# Patient Record
Sex: MALE | Race: WHITE | NOT HISPANIC OR LATINO
[De-identification: names, ages, dates, MRNs, and addresses within clinical notes are randomized per-mention and may not be internally consistent; named-entity substitution may affect disease eponyms.]

---

## 2017-09-21 ENCOUNTER — FORM ENCOUNTER (OUTPATIENT)
Age: 72
End: 2017-09-21

## 2017-09-22 ENCOUNTER — OUTPATIENT (OUTPATIENT)
Dept: OUTPATIENT SERVICES | Facility: HOSPITAL | Age: 72
LOS: 1 days | End: 2017-09-22
Payer: MEDICARE

## 2017-09-22 ENCOUNTER — APPOINTMENT (OUTPATIENT)
Dept: UROLOGY | Facility: CLINIC | Age: 72
End: 2017-09-22
Payer: MEDICARE

## 2017-09-22 ENCOUNTER — APPOINTMENT (OUTPATIENT)
Dept: CT IMAGING | Facility: IMAGING CENTER | Age: 72
End: 2017-09-22
Payer: MEDICARE

## 2017-09-22 VITALS
HEIGHT: 69 IN | BODY MASS INDEX: 32.58 KG/M2 | WEIGHT: 220 LBS | DIASTOLIC BLOOD PRESSURE: 84 MMHG | RESPIRATION RATE: 17 BRPM | HEART RATE: 57 BPM | TEMPERATURE: 98 F | SYSTOLIC BLOOD PRESSURE: 180 MMHG

## 2017-09-22 DIAGNOSIS — Z00.8 ENCOUNTER FOR OTHER GENERAL EXAMINATION: ICD-10-CM

## 2017-09-22 DIAGNOSIS — C64.9 MALIGNANT NEOPLASM OF UNSPECIFIED KIDNEY, EXCEPT RENAL PELVIS: ICD-10-CM

## 2017-09-22 PROCEDURE — 99214 OFFICE O/P EST MOD 30 MIN: CPT

## 2017-09-22 PROCEDURE — 74178 CT ABD&PLV WO CNTR FLWD CNTR: CPT

## 2017-09-22 PROCEDURE — 82565 ASSAY OF CREATININE: CPT

## 2017-09-22 PROCEDURE — 74178 CT ABD&PLV WO CNTR FLWD CNTR: CPT | Mod: 26

## 2017-10-10 ENCOUNTER — APPOINTMENT (OUTPATIENT)
Dept: UROLOGY | Facility: CLINIC | Age: 72
End: 2017-10-10
Payer: MEDICARE

## 2017-10-10 PROCEDURE — 99213 OFFICE O/P EST LOW 20 MIN: CPT

## 2018-09-14 ENCOUNTER — MESSAGE (OUTPATIENT)
Age: 73
End: 2018-09-14

## 2018-09-14 DIAGNOSIS — R79.89 OTHER SPECIFIED ABNORMAL FINDINGS OF BLOOD CHEMISTRY: ICD-10-CM

## 2018-09-18 ENCOUNTER — OTHER (OUTPATIENT)
Age: 73
End: 2018-09-18

## 2018-09-24 ENCOUNTER — FORM ENCOUNTER (OUTPATIENT)
Age: 73
End: 2018-09-24

## 2018-09-25 ENCOUNTER — MESSAGE (OUTPATIENT)
Age: 73
End: 2018-09-25

## 2018-09-25 ENCOUNTER — OUTPATIENT (OUTPATIENT)
Dept: OUTPATIENT SERVICES | Facility: HOSPITAL | Age: 73
LOS: 1 days | End: 2018-09-25

## 2018-09-25 ENCOUNTER — APPOINTMENT (OUTPATIENT)
Dept: UROLOGY | Facility: CLINIC | Age: 73
End: 2018-09-25
Payer: MEDICARE

## 2018-09-25 ENCOUNTER — APPOINTMENT (OUTPATIENT)
Dept: CT IMAGING | Facility: IMAGING CENTER | Age: 73
End: 2018-09-25
Payer: MEDICARE

## 2018-09-25 VITALS
TEMPERATURE: 97.8 F | BODY MASS INDEX: 33.33 KG/M2 | SYSTOLIC BLOOD PRESSURE: 143 MMHG | HEART RATE: 56 BPM | RESPIRATION RATE: 17 BRPM | WEIGHT: 225 LBS | HEIGHT: 69 IN | DIASTOLIC BLOOD PRESSURE: 82 MMHG

## 2018-09-25 DIAGNOSIS — R79.89 OTHER SPECIFIED ABNORMAL FINDINGS OF BLOOD CHEMISTRY: ICD-10-CM

## 2018-09-25 DIAGNOSIS — C64.9 MALIGNANT NEOPLASM OF UNSPECIFIED KIDNEY, EXCEPT RENAL PELVIS: ICD-10-CM

## 2018-09-25 PROCEDURE — 71250 CT THORAX DX C-: CPT | Mod: 26

## 2018-09-25 PROCEDURE — 74150 CT ABDOMEN W/O CONTRAST: CPT | Mod: 26

## 2018-09-25 PROCEDURE — 99214 OFFICE O/P EST MOD 30 MIN: CPT | Mod: PD

## 2018-09-25 RX ORDER — LEVOTHYROXINE SODIUM 0.14 MG/1
137 TABLET ORAL
Qty: 90 | Refills: 0 | Status: DISCONTINUED | COMMUNITY
Start: 2017-07-16 | End: 2018-09-25

## 2018-09-25 RX ORDER — IRBESARTAN 150 MG/1
150 TABLET ORAL
Refills: 0 | Status: ACTIVE | COMMUNITY

## 2018-09-26 ENCOUNTER — OUTPATIENT (OUTPATIENT)
Dept: OUTPATIENT SERVICES | Facility: HOSPITAL | Age: 73
LOS: 1 days | End: 2018-09-26

## 2018-09-26 ENCOUNTER — TRANSCRIPTION ENCOUNTER (OUTPATIENT)
Age: 73
End: 2018-09-26

## 2018-09-26 VITALS
OXYGEN SATURATION: 99 % | WEIGHT: 210.1 LBS | HEART RATE: 69 BPM | DIASTOLIC BLOOD PRESSURE: 83 MMHG | HEIGHT: 69 IN | TEMPERATURE: 99 F | RESPIRATION RATE: 14 BRPM | SYSTOLIC BLOOD PRESSURE: 146 MMHG

## 2018-09-26 DIAGNOSIS — Z90.5 ACQUIRED ABSENCE OF KIDNEY: Chronic | ICD-10-CM

## 2018-09-26 DIAGNOSIS — N20.0 CALCULUS OF KIDNEY: ICD-10-CM

## 2018-09-26 DIAGNOSIS — Z95.0 PRESENCE OF CARDIAC PACEMAKER: ICD-10-CM

## 2018-09-26 DIAGNOSIS — Z95.0 PRESENCE OF CARDIAC PACEMAKER: Chronic | ICD-10-CM

## 2018-09-26 DIAGNOSIS — Z01.818 ENCOUNTER FOR OTHER PREPROCEDURAL EXAMINATION: ICD-10-CM

## 2018-09-26 DIAGNOSIS — Z29.9 ENCOUNTER FOR PROPHYLACTIC MEASURES, UNSPECIFIED: ICD-10-CM

## 2018-09-26 LAB
ANION GAP SERPL CALC-SCNC: 8 MMOL/L — SIGNIFICANT CHANGE UP (ref 5–17)
BLD GP AB SCN SERPL QL: NEGATIVE — SIGNIFICANT CHANGE UP
BUN SERPL-MCNC: 33 MG/DL — HIGH (ref 7–23)
CALCIUM SERPL-MCNC: 10.7 MG/DL — HIGH (ref 8.4–10.5)
CHLORIDE SERPL-SCNC: 105 MMOL/L — SIGNIFICANT CHANGE UP (ref 96–108)
CO2 SERPL-SCNC: 27 MMOL/L — SIGNIFICANT CHANGE UP (ref 22–31)
CREAT SERPL-MCNC: 2.01 MG/DL — HIGH (ref 0.5–1.3)
GLUCOSE SERPL-MCNC: 84 MG/DL — SIGNIFICANT CHANGE UP (ref 70–99)
HCT VFR BLD CALC: 37 % — LOW (ref 39–50)
HGB BLD-MCNC: 12.2 G/DL — LOW (ref 13–17)
MCHC RBC-ENTMCNC: 30.8 PG — SIGNIFICANT CHANGE UP (ref 27–34)
MCHC RBC-ENTMCNC: 33 GM/DL — SIGNIFICANT CHANGE UP (ref 32–36)
MCV RBC AUTO: 93.4 FL — SIGNIFICANT CHANGE UP (ref 80–100)
PLATELET # BLD AUTO: 243 K/UL — SIGNIFICANT CHANGE UP (ref 150–400)
POTASSIUM SERPL-MCNC: 4.4 MMOL/L — SIGNIFICANT CHANGE UP (ref 3.5–5.3)
POTASSIUM SERPL-SCNC: 4.4 MMOL/L — SIGNIFICANT CHANGE UP (ref 3.5–5.3)
RBC # BLD: 3.96 M/UL — LOW (ref 4.2–5.8)
RBC # FLD: 12.1 % — SIGNIFICANT CHANGE UP (ref 10.3–14.5)
RH IG SCN BLD-IMP: POSITIVE — SIGNIFICANT CHANGE UP
SODIUM SERPL-SCNC: 140 MMOL/L — SIGNIFICANT CHANGE UP (ref 135–145)
WBC # BLD: 6.01 K/UL — SIGNIFICANT CHANGE UP (ref 3.8–10.5)
WBC # FLD AUTO: 6.01 K/UL — SIGNIFICANT CHANGE UP (ref 3.8–10.5)

## 2018-09-26 NOTE — H&P PST ADULT - PROBLEM SELECTOR PLAN 2
PPM last interrogated in early 9/2018.  called cardiologist to fax report to [facility].  OR booking notified.

## 2018-09-26 NOTE — H&P PST ADULT - ACTIVITY
able to climb a flight of stairs able to walk up 2 flights of stairs, able to jog for 1 block, plays golf, occasional walks

## 2018-09-26 NOTE — H&P PST ADULT - PMH
Aneurysm of iliac artery  left  Malignant neoplasm    Hypothyroidism    Thyroid cancer  thyroidectomy, no radiation or chemo  Pacemaker  2006  Hyperlipidemia    Hypertension Aneurysm of iliac artery  left  Hyperlipidemia    Hypertension    Hypothyroidism    Malignant neoplasm urinary    Pacemaker  secondary to syncope, bradycardia 2006, battery replacement 2017  Thyroid cancer  thyroidectomy, no radiation or chemo Aneurysm of iliac artery  left  Hyperlipidemia    Hypertension    Hypothyroidism    Malignant neoplasm urinary    Pacemaker  secondary to 1 degree AV block, syncope, bradycardia 2006, battery replacement 2017  Renal cell carcinoma, left  2013, s/p left nephrectomy  Thyroid cancer  thyroidectomy, no radiation or chemo

## 2018-09-26 NOTE — H&P PST ADULT - PSH
History of tonsillectomy  childhood  Aneurysm of iliac artery  1/2013- left  H/O thyroidectomy  1/2013 Cardiac pacemaker    H/O thyroidectomy  1/2013  H/O unilateral nephrectomy  left 2013  History of tonsillectomy  childhood  iliac artery stent  1/2013- left

## 2018-09-26 NOTE — H&P PST ADULT - VENOUS THROMBOEMBOLISM CURRENT STATUS
major surgery lasting 2-3 hrs major surgery, including arthroscopic and laparoscopic (greater than 1 hr)/swollen legs

## 2018-09-26 NOTE — H&P PST ADULT - NS MD HP INPLANTS MED DEV
Pacemaker/model 5386, serial- [id number], left illiac stent ST Becerril 5386, ON 1270, left illiac stent/Pacemaker

## 2018-09-26 NOTE — H&P PST ADULT - HISTORY OF PRESENT ILLNESS
69y/o male scheduled for laparoscopic left partial nephrectomy, possible radical nephrectomy on 1/30/2013.  Pt states, "12/2012 had blood in the urine, cat scan shows right kidney mass.  Also showed left iliac  aneurysm- stent was placed 1/2013."      Pt has a pacemaker.  ---------------  MARLY FLORES is here today for a follow-up visit for s/p left nephrectomy in 2013 for RCC Pt1b FG 3 and right marely stone.     History of Present Illness  Left partial nephrectomy in 2013 . Right renal stone as well . His creatinine is 2.1 today with GFR of 37 . Scans were done without contrast . His nephrologist decreased his meds due to increase in creatinine .   He has protein and blood in his urine follow by nephrologist .He has no complains .    Had fevers from a URI. UCx is negative.     Patient is currently experiencing urinary frequency and nocturia.   Pain Level: None     Active Problems 72 yo male. h/o HTN, HLD,  s/p left nephrectomy in 2013 for RCC, thyroid carcinoma (s/p thyroidectomy), s/p PPM insertion (syncope, 1st degree AV block, bradycardia 2006).  recent follow up urology imaging study revealed renal calculus, presents to PST today scheduled for cystoscopy, stone extraction and possible right PCNL.

## 2018-09-26 NOTE — H&P PST ADULT - OTHER CARE PROVIDERS
Dr. Stuart Landau cardiologist [phone], fax [phone], Dr. Lucasious vascular [phone] Dr. Stuart Landau cardiologist [phone], fax [phone], [facility] [phone], urologist Dr. Pedroza , nephrologist Dr. Fritz Perez , endocrine Craig Perlman

## 2018-09-26 NOTE — H&P PST ADULT - NSANTHOSAYNRD_GEN_A_CORE
No. NAOMI screening performed.  STOP BANG Legend: 0-2 = LOW Risk; 3-4 = INTERMEDIATE Risk; 5-8 = HIGH Risk

## 2018-09-26 NOTE — H&P PST ADULT - ASSESSMENT
CAPRINI SCORE    AGE RELATED RISK FACTORS                                                       MOBILITY RELATED FACTORS  [ ] Age 41-60 years                                            (1 Point)                  [ ] Bed rest                                                        (1 Point)  [ x] Age: 61-74 years                                           (2 Points)                [ ] Plaster cast                                                   (2 Points)  [ ] Age= 75 years                                              (3 Points)                 [ ] Bed bound for more than 72 hours                   (2 Points)    DISEASE RELATED RISK FACTORS                                               GENDER SPECIFIC FACTORS  [ ] Edema in the lower extremities                       (1 Point)                  [ ] Pregnancy                                                     (1 Point)  [ ] Varicose veins                                               (1 Point)                  [ ] Post-partum < 6 weeks                                   (1 Point)             [x ] BMI > 25 Kg/m2                                            (1 Point)                  [ ] Hormonal therapy  or oral contraception            (1 Point)                 [ ] Sepsis (in the previous month)                        (1 Point)                  [ ] History of pregnancy complications  [ ] Pneumonia or serious lung disease                                               [ ] Unexplained or recurrent                       (1 Point)           (in the previous month)                               (1 Point)  [ ] Abnormal pulmonary function test                     (1 Point)                 SURGERY RELATED RISK FACTORS  [ ] Acute myocardial infarction                              (1 Point)                 [ ]  Section                                            (1 Point)  [ ] Congestive heart failure (in the previous month)  (1 Point)                 [ ] Minor surgery                                                 (1 Point)   [ ] Inflammatory bowel disease                             (1 Point)                 [ ] Arthroscopic surgery                                        (2 Points)  [ ] Central venous access                                    (2 Points)                [ x] General surgery lasting more than 45 minutes   (2 Points)       [ ] Stroke (in the previous month)                          (5 Points)               [ ] Elective arthroplasty                                        (5 Points)                                                                                                                                               HEMATOLOGY RELATED FACTORS                                                 TRAUMA RELATED RISK FACTORS  [ ] Prior episodes of VTE                                     (3 Points)                 [ ] Fracture of the hip, pelvis, or leg                       (5 Points)  [ ] Positive family history for VTE                         (3 Points)                 [ ] Acute spinal cord injury (in the previous month)  (5 Points)  [ ] Prothrombin 50015 A                                      (3 Points)                 [ ] Paralysis  (less than 1 month)                          (5 Points)  [ ] Factor V Leiden                                             (3 Points)                 [ ] Multiple Trauma within 1 month                         (5 Points)  [ ] Lupus anticoagulants                                     (3 Points)                                                           [ ] Anticardiolipin antibodies                                (3 Points)                                                       [ ] High homocysteine in the blood                      (3 Points)                                             [ ] Other congenital or acquired thrombophilia       (3 Points)                                                [ ] Heparin induced thrombocytopenia                  (3 Points)                                          Total Score [ 5         ]

## 2018-09-26 NOTE — H&P PST ADULT - PROBLEM SELECTOR PLAN 1
cystoscopy  possible right ureteroscopy with laser lithotripsy  stone extraction  possible right percutaneous stone extraction

## 2018-09-27 ENCOUNTER — OUTPATIENT (OUTPATIENT)
Dept: INPATIENT UNIT | Facility: HOSPITAL | Age: 73
LOS: 1 days | End: 2018-09-27
Payer: MEDICARE

## 2018-09-27 ENCOUNTER — APPOINTMENT (OUTPATIENT)
Dept: UROLOGY | Facility: HOSPITAL | Age: 73
End: 2018-09-27

## 2018-09-27 ENCOUNTER — RESULT REVIEW (OUTPATIENT)
Age: 73
End: 2018-09-27

## 2018-09-27 VITALS
DIASTOLIC BLOOD PRESSURE: 78 MMHG | OXYGEN SATURATION: 98 % | RESPIRATION RATE: 18 BRPM | WEIGHT: 210.1 LBS | TEMPERATURE: 98 F | SYSTOLIC BLOOD PRESSURE: 121 MMHG | HEIGHT: 69 IN | HEART RATE: 75 BPM

## 2018-09-27 DIAGNOSIS — Z95.0 PRESENCE OF CARDIAC PACEMAKER: Chronic | ICD-10-CM

## 2018-09-27 DIAGNOSIS — N20.0 CALCULUS OF KIDNEY: ICD-10-CM

## 2018-09-27 DIAGNOSIS — Z90.5 ACQUIRED ABSENCE OF KIDNEY: Chronic | ICD-10-CM

## 2018-09-27 LAB — RH IG SCN BLD-IMP: POSITIVE — SIGNIFICANT CHANGE UP

## 2018-09-27 PROCEDURE — 88300 SURGICAL PATH GROSS: CPT | Mod: 26

## 2018-09-27 RX ORDER — LIDOCAINE HCL 20 MG/ML
0.2 VIAL (ML) INJECTION ONCE
Qty: 0 | Refills: 0 | Status: DISCONTINUED | OUTPATIENT
Start: 2018-09-27 | End: 2018-09-28

## 2018-09-27 RX ORDER — HYDROMORPHONE HYDROCHLORIDE 2 MG/ML
0.25 INJECTION INTRAMUSCULAR; INTRAVENOUS; SUBCUTANEOUS
Qty: 0 | Refills: 0 | Status: DISCONTINUED | OUTPATIENT
Start: 2018-09-27 | End: 2018-09-28

## 2018-09-27 RX ORDER — HEPARIN SODIUM 5000 [USP'U]/ML
5000 INJECTION INTRAVENOUS; SUBCUTANEOUS EVERY 8 HOURS
Qty: 0 | Refills: 0 | Status: DISCONTINUED | OUTPATIENT
Start: 2018-09-27 | End: 2018-09-28

## 2018-09-27 RX ORDER — ACETAMINOPHEN 500 MG
650 TABLET ORAL EVERY 6 HOURS
Qty: 0 | Refills: 0 | Status: DISCONTINUED | OUTPATIENT
Start: 2018-09-27 | End: 2018-09-28

## 2018-09-27 RX ORDER — OXYCODONE AND ACETAMINOPHEN 5; 325 MG/1; MG/1
1 TABLET ORAL EVERY 4 HOURS
Qty: 0 | Refills: 0 | Status: DISCONTINUED | OUTPATIENT
Start: 2018-09-27 | End: 2018-09-28

## 2018-09-27 RX ORDER — CEFAZOLIN SODIUM 1 G
2000 VIAL (EA) INJECTION EVERY 8 HOURS
Qty: 0 | Refills: 0 | Status: DISCONTINUED | OUTPATIENT
Start: 2018-09-28 | End: 2018-09-28

## 2018-09-27 RX ORDER — OXYCODONE AND ACETAMINOPHEN 5; 325 MG/1; MG/1
2 TABLET ORAL EVERY 4 HOURS
Qty: 0 | Refills: 0 | Status: DISCONTINUED | OUTPATIENT
Start: 2018-09-27 | End: 2018-09-28

## 2018-09-27 RX ORDER — INFLUENZA VIRUS VACCINE 15; 15; 15; 15 UG/.5ML; UG/.5ML; UG/.5ML; UG/.5ML
0.5 SUSPENSION INTRAMUSCULAR ONCE
Qty: 0 | Refills: 0 | Status: DISCONTINUED | OUTPATIENT
Start: 2018-09-27 | End: 2018-09-28

## 2018-09-27 RX ORDER — ONDANSETRON 8 MG/1
4 TABLET, FILM COATED ORAL ONCE
Qty: 0 | Refills: 0 | Status: DISCONTINUED | OUTPATIENT
Start: 2018-09-27 | End: 2018-09-28

## 2018-09-27 RX ORDER — SODIUM CHLORIDE 9 MG/ML
3 INJECTION INTRAMUSCULAR; INTRAVENOUS; SUBCUTANEOUS EVERY 8 HOURS
Qty: 0 | Refills: 0 | Status: DISCONTINUED | OUTPATIENT
Start: 2018-09-27 | End: 2018-09-28

## 2018-09-27 RX ORDER — SODIUM CHLORIDE 9 MG/ML
1000 INJECTION, SOLUTION INTRAVENOUS
Qty: 0 | Refills: 0 | Status: DISCONTINUED | OUTPATIENT
Start: 2018-09-27 | End: 2018-09-28

## 2018-09-27 NOTE — BRIEF OPERATIVE NOTE - PROCEDURE
<<-----Click on this checkbox to enter Procedure Cystoscopy with percutaneous right nephrolithotomy  09/27/2018    Active  CTABIB

## 2018-09-27 NOTE — BRIEF OPERATIVE NOTE - OPERATION/FINDINGS
1 stick 1 dilation  Balloon dilation: 3:18.   Primary - Carlos Alberto. Assist - Tabib  Additional: Had to extend skin incision to get sheath in. Super stiff into bladder.

## 2018-09-27 NOTE — PATIENT PROFILE ADULT. - PSH
Cardiac pacemaker    H/O thyroidectomy  1/2013  H/O unilateral nephrectomy  left 2013  History of tonsillectomy  childhood  iliac artery stent  1/2013- left

## 2018-09-27 NOTE — PATIENT PROFILE ADULT. - PMH
Aneurysm of iliac artery  left  Hyperlipidemia    Hypertension    Hypothyroidism    Malignant neoplasm urinary    Pacemaker  secondary to 1 degree AV block, syncope, bradycardia 2006, battery replacement 2017  Renal cell carcinoma, left  2013, s/p left nephrectomy  Thyroid cancer  thyroidectomy, no radiation or chemo

## 2018-09-28 ENCOUNTER — TRANSCRIPTION ENCOUNTER (OUTPATIENT)
Age: 73
End: 2018-09-28

## 2018-09-28 VITALS
HEART RATE: 80 BPM | DIASTOLIC BLOOD PRESSURE: 60 MMHG | RESPIRATION RATE: 16 BRPM | SYSTOLIC BLOOD PRESSURE: 115 MMHG | OXYGEN SATURATION: 98 %

## 2018-09-28 PROBLEM — C64.2 MALIGNANT NEOPLASM OF LEFT KIDNEY, EXCEPT RENAL PELVIS: Chronic | Status: ACTIVE | Noted: 2018-09-26

## 2018-09-28 LAB
ANION GAP SERPL CALC-SCNC: 15 MMOL/L — SIGNIFICANT CHANGE UP (ref 5–17)
BASOPHILS # BLD AUTO: 0 K/UL — SIGNIFICANT CHANGE UP (ref 0–0.2)
BASOPHILS NFR BLD AUTO: 0.7 % — SIGNIFICANT CHANGE UP (ref 0–2)
BUN SERPL-MCNC: 35 MG/DL — HIGH (ref 7–23)
CALCIUM SERPL-MCNC: 9.7 MG/DL — SIGNIFICANT CHANGE UP (ref 8.4–10.5)
CHLORIDE SERPL-SCNC: 107 MMOL/L — SIGNIFICANT CHANGE UP (ref 96–108)
CO2 SERPL-SCNC: 21 MMOL/L — LOW (ref 22–31)
CREAT SERPL-MCNC: 2.17 MG/DL — HIGH (ref 0.5–1.3)
EOSINOPHIL # BLD AUTO: 0 K/UL — SIGNIFICANT CHANGE UP (ref 0–0.5)
EOSINOPHIL NFR BLD AUTO: 0.1 % — SIGNIFICANT CHANGE UP (ref 0–6)
GLUCOSE SERPL-MCNC: 112 MG/DL — HIGH (ref 70–99)
HCT VFR BLD CALC: 37.8 % — LOW (ref 39–50)
HGB BLD-MCNC: 13.1 G/DL — SIGNIFICANT CHANGE UP (ref 13–17)
LYMPHOCYTES # BLD AUTO: 0.1 K/UL — LOW (ref 1–3.3)
LYMPHOCYTES # BLD AUTO: 2 % — LOW (ref 13–44)
MCHC RBC-ENTMCNC: 32.5 PG — SIGNIFICANT CHANGE UP (ref 27–34)
MCHC RBC-ENTMCNC: 34.6 GM/DL — SIGNIFICANT CHANGE UP (ref 32–36)
MCV RBC AUTO: 93.8 FL — SIGNIFICANT CHANGE UP (ref 80–100)
MONOCYTES # BLD AUTO: 0.1 K/UL — SIGNIFICANT CHANGE UP (ref 0–0.9)
MONOCYTES NFR BLD AUTO: 0.9 % — LOW (ref 2–14)
NEUTROPHILS # BLD AUTO: 6.6 K/UL — SIGNIFICANT CHANGE UP (ref 1.8–7.4)
NEUTROPHILS NFR BLD AUTO: 96.3 % — HIGH (ref 43–77)
PLATELET # BLD AUTO: 168 K/UL — SIGNIFICANT CHANGE UP (ref 150–400)
POTASSIUM SERPL-MCNC: 3.9 MMOL/L — SIGNIFICANT CHANGE UP (ref 3.5–5.3)
POTASSIUM SERPL-SCNC: 3.9 MMOL/L — SIGNIFICANT CHANGE UP (ref 3.5–5.3)
RBC # BLD: 4.03 M/UL — LOW (ref 4.2–5.8)
RBC # FLD: 11.2 % — SIGNIFICANT CHANGE UP (ref 10.3–14.5)
SODIUM SERPL-SCNC: 143 MMOL/L — SIGNIFICANT CHANGE UP (ref 135–145)
WBC # BLD: 6.8 K/UL — SIGNIFICANT CHANGE UP (ref 3.8–10.5)
WBC # FLD AUTO: 6.8 K/UL — SIGNIFICANT CHANGE UP (ref 3.8–10.5)

## 2018-09-28 PROCEDURE — C1887: CPT

## 2018-09-28 PROCEDURE — C1726: CPT

## 2018-09-28 PROCEDURE — 86900 BLOOD TYPING SEROLOGIC ABO: CPT

## 2018-09-28 PROCEDURE — C2617: CPT

## 2018-09-28 PROCEDURE — G0463: CPT

## 2018-09-28 PROCEDURE — 86901 BLOOD TYPING SEROLOGIC RH(D): CPT

## 2018-09-28 PROCEDURE — 71045 X-RAY EXAM CHEST 1 VIEW: CPT

## 2018-09-28 PROCEDURE — 87070 CULTURE OTHR SPECIMN AEROBIC: CPT

## 2018-09-28 PROCEDURE — 85027 COMPLETE CBC AUTOMATED: CPT

## 2018-09-28 PROCEDURE — 80048 BASIC METABOLIC PNL TOTAL CA: CPT

## 2018-09-28 PROCEDURE — 74150 CT ABDOMEN W/O CONTRAST: CPT

## 2018-09-28 PROCEDURE — 88300 SURGICAL PATH GROSS: CPT

## 2018-09-28 PROCEDURE — C1769: CPT

## 2018-09-28 PROCEDURE — 76000 FLUOROSCOPY <1 HR PHYS/QHP: CPT

## 2018-09-28 PROCEDURE — 50080 PERQ NL/PL LITHOTRP SMPL<2CM: CPT | Mod: RT

## 2018-09-28 PROCEDURE — C1758: CPT

## 2018-09-28 PROCEDURE — 86850 RBC ANTIBODY SCREEN: CPT

## 2018-09-28 PROCEDURE — 82365 CALCULUS SPECTROSCOPY: CPT

## 2018-09-28 PROCEDURE — 87186 SC STD MICRODIL/AGAR DIL: CPT

## 2018-09-28 PROCEDURE — C1889: CPT

## 2018-09-28 PROCEDURE — 71045 X-RAY EXAM CHEST 1 VIEW: CPT | Mod: 26

## 2018-09-28 PROCEDURE — 71250 CT THORAX DX C-: CPT

## 2018-09-28 RX ORDER — METOPROLOL TARTRATE 50 MG
0 TABLET ORAL
Qty: 0 | Refills: 0 | COMMUNITY

## 2018-09-28 RX ORDER — LEVOTHYROXINE SODIUM 125 MCG
137 TABLET ORAL DAILY
Qty: 0 | Refills: 0 | Status: DISCONTINUED | OUTPATIENT
Start: 2018-09-28 | End: 2018-09-28

## 2018-09-28 RX ORDER — LOSARTAN POTASSIUM 100 MG/1
50 TABLET, FILM COATED ORAL DAILY
Qty: 0 | Refills: 0 | Status: DISCONTINUED | OUTPATIENT
Start: 2018-09-28 | End: 2018-09-28

## 2018-09-28 RX ORDER — LEVOTHYROXINE SODIUM 125 MCG
1 TABLET ORAL
Qty: 0 | Refills: 0 | COMMUNITY

## 2018-09-28 RX ORDER — IRBESARTAN 75 MG/1
1 TABLET ORAL
Qty: 0 | Refills: 0 | COMMUNITY

## 2018-09-28 RX ORDER — AMLODIPINE BESYLATE 2.5 MG/1
10 TABLET ORAL DAILY
Qty: 0 | Refills: 0 | Status: DISCONTINUED | OUTPATIENT
Start: 2018-09-28 | End: 2018-09-28

## 2018-09-28 RX ORDER — ATORVASTATIN CALCIUM 80 MG/1
1 TABLET, FILM COATED ORAL
Qty: 0 | Refills: 0 | COMMUNITY

## 2018-09-28 RX ORDER — SODIUM CHLORIDE 9 MG/ML
1000 INJECTION INTRAMUSCULAR; INTRAVENOUS; SUBCUTANEOUS ONCE
Qty: 0 | Refills: 0 | Status: COMPLETED | OUTPATIENT
Start: 2018-09-28 | End: 2018-09-28

## 2018-09-28 RX ORDER — AMLODIPINE BESYLATE 2.5 MG/1
1 TABLET ORAL
Qty: 0 | Refills: 0 | COMMUNITY

## 2018-09-28 RX ORDER — LIDOCAINE 4 G/100G
1 CREAM TOPICAL EVERY 4 HOURS
Qty: 0 | Refills: 0 | Status: DISCONTINUED | OUTPATIENT
Start: 2018-09-28 | End: 2018-09-28

## 2018-09-28 RX ORDER — METOPROLOL TARTRATE 50 MG
25 TABLET ORAL ONCE
Qty: 0 | Refills: 0 | Status: COMPLETED | OUTPATIENT
Start: 2018-09-28 | End: 2018-09-28

## 2018-09-28 RX ORDER — ASPIRIN/CALCIUM CARB/MAGNESIUM 324 MG
2 TABLET ORAL
Qty: 0 | Refills: 0 | COMMUNITY

## 2018-09-28 RX ADMIN — SODIUM CHLORIDE 1000 MILLILITER(S): 9 INJECTION INTRAMUSCULAR; INTRAVENOUS; SUBCUTANEOUS at 20:44

## 2018-09-28 RX ADMIN — Medication 137 MICROGRAM(S): at 06:56

## 2018-09-28 RX ADMIN — Medication 100 MILLIGRAM(S): at 12:51

## 2018-09-28 RX ADMIN — Medication 650 MILLIGRAM(S): at 06:56

## 2018-09-28 RX ADMIN — SODIUM CHLORIDE 3 MILLILITER(S): 9 INJECTION INTRAMUSCULAR; INTRAVENOUS; SUBCUTANEOUS at 21:09

## 2018-09-28 RX ADMIN — SODIUM CHLORIDE 3 MILLILITER(S): 9 INJECTION INTRAMUSCULAR; INTRAVENOUS; SUBCUTANEOUS at 06:08

## 2018-09-28 RX ADMIN — Medication 25 MILLIGRAM(S): at 05:10

## 2018-09-28 RX ADMIN — HEPARIN SODIUM 5000 UNIT(S): 5000 INJECTION INTRAVENOUS; SUBCUTANEOUS at 15:45

## 2018-09-28 RX ADMIN — Medication 100 MILLIGRAM(S): at 05:00

## 2018-09-28 RX ADMIN — Medication 650 MILLIGRAM(S): at 06:15

## 2018-09-28 RX ADMIN — Medication 100 MILLIGRAM(S): at 21:12

## 2018-09-28 RX ADMIN — SODIUM CHLORIDE 100 MILLILITER(S): 9 INJECTION, SOLUTION INTRAVENOUS at 06:12

## 2018-09-28 RX ADMIN — HEPARIN SODIUM 5000 UNIT(S): 5000 INJECTION INTRAVENOUS; SUBCUTANEOUS at 06:13

## 2018-09-28 NOTE — DISCHARGE NOTE ADULT - HOSPITAL COURSE
pt is a 74 yo m with a pmh of left nephrectomy 2013 for RCC, thyroid cancer and PPM who arrived at [facility] on 9/27/18 for a right pcnl for nephrolithiasis. please see the operative report for further details. Post op he underwent a successful tov, his labs were stable. Overnight, he became tachycardiac which was attributed to his not taking his home beta blocker. Medication was provided and tachycardia resolved. He was dcd home in stable condition pt is a 72 yo m with a pmh of left nephrectomy 2013 for RCC, thyroid cancer and PPM who arrived at [facility] on 9/27/18 for a right pcnl for nephrolithiasis. please see the operative report for further details. Overnight, he became tachycardiac which was attributed to his not taking his home beta blocker. POD#1 he underwent a successful tov, his labs were stable. Medication was provided and tachycardia resolved. He was dcd home in stable condition

## 2018-09-28 NOTE — PROGRESS NOTE ADULT - ASSESSMENT
A/P: 73y Male s/p R PCNL, feel well post op    plan  f/u CXR  DVT prophylaxis/OOB  Incentive spirometry  Strict I&O's  Analgesia and antiemetics as needed  Diet  AM labs

## 2018-09-28 NOTE — PROVIDER CONTACT NOTE (OTHER) - SITUATION
Pt s/p right percutaneous kidney stone extraction. Noted to have increase in HR from 80s-now low 100s.

## 2018-09-28 NOTE — PROGRESS NOTE ADULT - ASSESSMENT
A/P: 73y Male s/p R PCNL,     plan  color ck in a few hrs after hydration to re assess urine color   trend heart rate   f/u CXR  DVT prophylaxis/OOB  Incentive spirometry  Strict I&O's  Analgesia and antiemetics as needed  Diet  AM labs

## 2018-09-28 NOTE — PROVIDER CONTACT NOTE (OTHER) - ACTION/TREATMENT ORDERED:
No interventions at time. Follow up with labs and recheck for temp. No interventions at time. Follow up with labs and recheck for temp. To receive am dose of metoprolol.

## 2018-09-28 NOTE — PROVIDER CONTACT NOTE (OTHER) - RECOMMENDATIONS
Possible fluids? Possible fluids?  *pt noted in H&P to take 25mg of metoprolol in am and 50m in pm. Pt did not received pm dose. PA made aware of home medication and am dose ordered.

## 2018-09-28 NOTE — DISCHARGE NOTE ADULT - MEDICATION SUMMARY - MEDICATIONS TO TAKE
I will START or STAY ON the medications listed below when I get home from the hospital:    metoprolol tartrate 25 mg oral tablet  -- 1 tab in AM, 2 tabs in PM  -- Indication: For home medication     supplements: calcium, centrum, glucosamine  -- Indication: For home medication     oxyCODONE-acetaminophen 5 mg-325 mg oral tablet  -- 1 tab(s) by mouth every 4 hours, As needed, Moderate Pain (4 - 6) MDD:6  -- Indication: For pain medication     aspirin 81 mg oral tablet  -- 2 tab(s) by mouth once a day  pt was instructed to hold aspirin, last dose 9/24  -- Indication: For home medication     irbesartan 150 mg oral tablet  -- 1 tab(s) by mouth once a day  -- Indication: For home medication     atorvastatin 40 mg oral tablet  -- 1 tab(s) by mouth once a day  -- Indication: For home medication     amLODIPine 10 mg oral tablet  -- 1 tab(s) by mouth once a day  -- Indication: For home medication     Colace 100 mg oral capsule  -- 1 cap(s) by mouth 2 times a day  -- Indication: For stool softener    Augmentin 875 mg-125 mg oral tablet  -- 1 tab(s) by mouth every 12 hours   -- Finish all this medication unless otherwise directed by prescriber.  Take with food or milk.    -- Indication: For antibiotic     levothyroxine 137 mcg (0.137 mg) oral tablet  -- 1 tab(s) by mouth once a day  -- Indication: For home medication

## 2018-09-28 NOTE — DISCHARGE NOTE ADULT - CARE PROVIDERS DIRECT ADDRESSES
[email or web address]

## 2018-09-28 NOTE — PROVIDER CONTACT NOTE (OTHER) - BACKGROUND
pt with hx of ppm with preserved EF. Pt noted to have HR in 70s-90s, until 0230am. Pt received iv tylenol at 2200 after procedure was complete.

## 2018-09-28 NOTE — PROGRESS NOTE ADULT - SUBJECTIVE AND OBJECTIVE BOX
Subjective  feeling well pain controlled no complaints; episode of tachycardia overnight     Objective    Vital signs  T(F): , Max: 99.3 (09-28-18 @ 04:00)  HR: 101 (09-28-18 @ 07:00)  BP: 115/62 (09-28-18 @ 06:00)  SpO2: 98% (09-28-18 @ 07:00)  Wt(kg): --    Output     09-27 @ 07:01  -  09-28 @ 07:00  --------------------------------------------------------  IN: 2100 mL / OUT: 1125 mL / NET: 975 mL        Gen awake alert nad axox3  Abd obese soft ntnd   Back hematoma/ ecchymosis     urine dilute punch     Labs      09-28 @ 02:55    WBC 6.8   / Hct 37.8  / SCr 2.17     09-26 @ 17:04    WBC 6.01  / Hct 37.0  / SCr --

## 2018-09-28 NOTE — DISCHARGE NOTE ADULT - PATIENT PORTAL LINK FT
You can access the [facility] Patient Portal, offered by [facility], by registering with the following website: [email or web address]

## 2018-09-28 NOTE — DISCHARGE NOTE ADULT - PLAN OF CARE
you had a right percutaneous nephrolithotomy Call the office if you experience fever, chills, uncontrolled pain, the inability to tolerate liquids, or the urine does not flow   to promote wound healing do not take a bath, continue to walk frequently, return to daily living activities slowly, no heavy lifting greater than 10lbs for 4-6 weeks, follow up Dr Hoenig in one to two weeks maintain adequate cholesterol continue home medication maintain adequate blood pressure

## 2018-09-28 NOTE — PROGRESS NOTE ADULT - SUBJECTIVE AND OBJECTIVE BOX
Post op Check    Pt 72y/o M hx of ureteral stone s/p R PCNL seen and examined without complaints. Pain is controlled. Denies SOB/CP/N/V.     Vital Signs Last 24 Hrs  T(C): 36.8 (28 Sep 2018 01:00), Max: 36.8 (27 Sep 2018 15:11)  T(F): 98.2 (28 Sep 2018 01:00), Max: 98.2 (27 Sep 2018 15:11)  HR: 81 (28 Sep 2018 01:00) (71 - 81)  BP: 126/63 (28 Sep 2018 01:00) (111/52 - 130/68)  BP(mean): 88 (28 Sep 2018 01:00) (72 - 93)  RR: 16 (28 Sep 2018 01:00) (16 - 18)  SpO2: 95% (28 Sep 2018 01:00) (95% - 100%)    I&O's Summary    27 Sep 2018 07:01  -  28 Sep 2018 01:43  --------------------------------------------------------  IN: 540 mL / OUT: 350 mL / NET: 190 mL        Physical Exam  Gen: NAD, A&Ox3  Pulm: No respiratory distress, no subcostal retractions  CV: RRR, no JVD  Abd: Soft, NT, ND  Back:  dressing is on, dry and clean. no tenderness  : Kirby in place and draining pink urine                          12.2   6.01  )-----------( 243      ( 26 Sep 2018 17:04 )             37.0       09-26    140  |  105  |  33<H>  ----------------------------<  84  4.4   |  27  |  2.01<H>    Ca    10.7<H>      26 Sep 2018 15:24

## 2018-09-28 NOTE — PROVIDER CONTACT NOTE (OTHER) - ASSESSMENT
Pt remains afebrile with temp orally and temporally 98.6. /61, RR 14 and spo2=95 on RA. pt sleeping at time. No c/o pain, n/v or SOB. Pt denies palpitations or chest discomfort. AM labs sent

## 2018-09-28 NOTE — DISCHARGE NOTE ADULT - CARE PLAN
Principal Discharge DX:	Nephrolithiasis  Goal:	you had a right percutaneous nephrolithotomy  Assessment and plan of treatment:	Call the office if you experience fever, chills, uncontrolled pain, the inability to tolerate liquids, or the urine does not flow   to promote wound healing do not take a bath, continue to walk frequently, return to daily living activities slowly, no heavy lifting greater than 10lbs for 4-6 weeks, follow up Dr Hoenig in one to two weeks  Secondary Diagnosis:	Pure hypercholesterolemia  Goal:	maintain adequate cholesterol  Assessment and plan of treatment:	continue home medication  Secondary Diagnosis:	Essential hypertension  Goal:	maintain adequate blood pressure  Assessment and plan of treatment:	continue home medication

## 2018-09-28 NOTE — PROGRESS NOTE ADULT - SUBJECTIVE AND OBJECTIVE BOX
Pt voided twice, 100cc each time, with pvrs of 79cc and 97cc respectively.  Bladder scans were performed by myself.  Given he is able to empty his bladder appropriately, he is safe for discharge

## 2018-09-28 NOTE — DISCHARGE NOTE ADULT - CARE PROVIDER_API CALL
Hoenig, David M (MD), Urology  [facility] Dept of Urology  [address] [place] [address]  Phone: [phone]  Fax: [phone]

## 2018-09-28 NOTE — PROGRESS NOTE ADULT - ATTENDING COMMENTS
Pt seen and examined.  Comfortable.  resp wnl  urine blood tinged via rai  agree with plan above- likely TOV and plan for d/c if all well

## 2018-09-28 NOTE — DISCHARGE NOTE ADULT - CONTRAINDICATIONS & PRECAUTIONS (SELECT ALL THAT APPLY)
no
Moderate to severe acute illness with or without fever. Delay administration of vaccination until patient has been afebrile or illness resolved for 24hrs

## 2018-09-30 LAB
-  AMPICILLIN: SIGNIFICANT CHANGE UP
-  TETRACYCLINE: SIGNIFICANT CHANGE UP
-  VANCOMYCIN: SIGNIFICANT CHANGE UP
CULTURE RESULTS: SIGNIFICANT CHANGE UP
METHOD TYPE: SIGNIFICANT CHANGE UP
ORGANISM # SPEC MICROSCOPIC CNT: SIGNIFICANT CHANGE UP
ORGANISM # SPEC MICROSCOPIC CNT: SIGNIFICANT CHANGE UP
SPECIMEN SOURCE: SIGNIFICANT CHANGE UP

## 2018-10-01 RX ORDER — DOCUSATE SODIUM 100 MG
1 CAPSULE ORAL
Qty: 0 | Refills: 0 | COMMUNITY

## 2018-10-02 LAB
COMPN STONE: SIGNIFICANT CHANGE UP
SURGICAL PATHOLOGY STUDY: SIGNIFICANT CHANGE UP

## 2018-10-05 ENCOUNTER — APPOINTMENT (OUTPATIENT)
Dept: UROLOGY | Facility: CLINIC | Age: 73
End: 2018-10-05
Payer: MEDICARE

## 2018-10-05 PROCEDURE — 99214 OFFICE O/P EST MOD 30 MIN: CPT | Mod: 24

## 2018-10-29 ENCOUNTER — TRANSCRIPTION ENCOUNTER (OUTPATIENT)
Age: 73
End: 2018-10-29

## 2018-12-06 ENCOUNTER — FORM ENCOUNTER (OUTPATIENT)
Age: 73
End: 2018-12-06

## 2018-12-07 ENCOUNTER — OUTPATIENT (OUTPATIENT)
Dept: OUTPATIENT SERVICES | Facility: HOSPITAL | Age: 73
LOS: 1 days | End: 2018-12-07
Payer: MEDICARE

## 2018-12-07 ENCOUNTER — APPOINTMENT (OUTPATIENT)
Dept: ULTRASOUND IMAGING | Facility: IMAGING CENTER | Age: 73
End: 2018-12-07
Payer: MEDICARE

## 2018-12-07 ENCOUNTER — APPOINTMENT (OUTPATIENT)
Dept: UROLOGY | Facility: CLINIC | Age: 73
End: 2018-12-07
Payer: MEDICARE

## 2018-12-07 DIAGNOSIS — Z90.5 ACQUIRED ABSENCE OF KIDNEY: Chronic | ICD-10-CM

## 2018-12-07 DIAGNOSIS — Z87.442 PERSONAL HISTORY OF URINARY CALCULI: ICD-10-CM

## 2018-12-07 DIAGNOSIS — E83.50 UNSPECIFIED DISORDER OF CALCIUM METABOLISM: ICD-10-CM

## 2018-12-07 DIAGNOSIS — Z95.0 PRESENCE OF CARDIAC PACEMAKER: Chronic | ICD-10-CM

## 2018-12-07 DIAGNOSIS — Z90.5 ACQUIRED ABSENCE OF KIDNEY: ICD-10-CM

## 2018-12-07 PROCEDURE — 99214 OFFICE O/P EST MOD 30 MIN: CPT | Mod: 24

## 2018-12-07 PROCEDURE — 76770 US EXAM ABDO BACK WALL COMP: CPT | Mod: 26

## 2018-12-07 PROCEDURE — 76770 US EXAM ABDO BACK WALL COMP: CPT

## 2019-06-27 ENCOUNTER — APPOINTMENT (OUTPATIENT)
Dept: UROLOGY | Facility: CLINIC | Age: 74
End: 2019-06-27
Payer: MEDICARE

## 2019-06-27 ENCOUNTER — OUTPATIENT (OUTPATIENT)
Dept: OUTPATIENT SERVICES | Facility: HOSPITAL | Age: 74
LOS: 1 days | End: 2019-06-27
Payer: MEDICARE

## 2019-06-27 DIAGNOSIS — N20.0 CALCULUS OF KIDNEY: ICD-10-CM

## 2019-06-27 DIAGNOSIS — Z90.5 ACQUIRED ABSENCE OF KIDNEY: Chronic | ICD-10-CM

## 2019-06-27 DIAGNOSIS — R82.991 HYPOCITRATURIA: ICD-10-CM

## 2019-06-27 DIAGNOSIS — Z90.5 ACQUIRED ABSENCE OF KIDNEY: ICD-10-CM

## 2019-06-27 DIAGNOSIS — Z95.0 PRESENCE OF CARDIAC PACEMAKER: Chronic | ICD-10-CM

## 2019-06-27 DIAGNOSIS — R35.0 FREQUENCY OF MICTURITION: ICD-10-CM

## 2019-06-27 PROCEDURE — 76775 US EXAM ABDO BACK WALL LIM: CPT | Mod: 26

## 2019-06-27 PROCEDURE — 99213 OFFICE O/P EST LOW 20 MIN: CPT | Mod: 25

## 2019-06-27 PROCEDURE — 76775 US EXAM ABDO BACK WALL LIM: CPT

## 2019-06-27 NOTE — HISTORY OF PRESENT ILLNESS
[FreeTextEntry1] : Pt returns for f/u- 6 months.  They underwent right PCNL on 9/27/18. On pot citrate for hypocitraturia.\par \par Stone analysis:80% Calcium oxalate dihydrate, 10% Calcium oxalate monohydrate,10% Calcium phosphate (apatite) \par \par Feeling well. No new health issues. No flank or abdominal discomfort, no hematuria.\par \par Is s/p left nx for tumor 2013.\par \par \par  [None] : no symptoms

## 2019-06-27 NOTE — PHYSICAL EXAM
[General Appearance - Well Developed] : well developed [Normal Appearance] : normal appearance [General Appearance - Well Nourished] : well nourished [Well Groomed] : well groomed [General Appearance - In No Acute Distress] : no acute distress [FreeTextEntry1] : healing right nephrostomy site [Exaggerated Use Of Accessory Muscles For Inspiration] : no accessory muscle use [Respiration, Rhythm And Depth] : normal respiratory rhythm and effort [] : no respiratory distress [Affect] : the affect was normal [Oriented To Time, Place, And Person] : oriented to person, place, and time [Normal Station and Gait] : the gait and station were normal for the patient's age [Mood] : the mood was normal [Not Anxious] : not anxious

## 2019-06-27 NOTE — ASSESSMENT
[FreeTextEntry1] : Renal US today reviewed: no stone right kidney. No hydro, no solid mass. Left renal fossa empty.  Incidental gall stone, shadowing.\par \par D/w pt- will resume routine f/u with Dr. Pedroza- likely imaging in the fall, and yearly f/u.\par \par Can cot pot citrate, and f/u with me on prn basis.

## 2019-07-02 DIAGNOSIS — Z90.5 ACQUIRED ABSENCE OF KIDNEY: ICD-10-CM

## 2019-07-02 DIAGNOSIS — C64.9 MALIGNANT NEOPLASM OF UNSPECIFIED KIDNEY, EXCEPT RENAL PELVIS: ICD-10-CM

## 2019-07-02 DIAGNOSIS — R82.991 HYPOCITRATURIA: ICD-10-CM

## 2019-07-02 DIAGNOSIS — N20.0 CALCULUS OF KIDNEY: ICD-10-CM

## 2019-07-09 ENCOUNTER — OTHER (OUTPATIENT)
Age: 74
End: 2019-07-09

## 2019-09-16 ENCOUNTER — MEDICATION RENEWAL (OUTPATIENT)
Age: 74
End: 2019-09-16

## 2019-09-25 ENCOUNTER — TRANSCRIPTION ENCOUNTER (OUTPATIENT)
Age: 74
End: 2019-09-25

## 2019-09-26 ENCOUNTER — FORM ENCOUNTER (OUTPATIENT)
Age: 74
End: 2019-09-26

## 2019-09-27 ENCOUNTER — APPOINTMENT (OUTPATIENT)
Dept: UROLOGY | Facility: CLINIC | Age: 74
End: 2019-09-27
Payer: MEDICARE

## 2019-09-27 ENCOUNTER — OUTPATIENT (OUTPATIENT)
Dept: OUTPATIENT SERVICES | Facility: HOSPITAL | Age: 74
LOS: 1 days | End: 2019-09-27
Payer: MEDICARE

## 2019-09-27 ENCOUNTER — APPOINTMENT (OUTPATIENT)
Dept: CT IMAGING | Facility: IMAGING CENTER | Age: 74
End: 2019-09-27
Payer: MEDICARE

## 2019-09-27 DIAGNOSIS — Z87.442 PERSONAL HISTORY OF URINARY CALCULI: ICD-10-CM

## 2019-09-27 DIAGNOSIS — C64.9 MALIGNANT NEOPLASM OF UNSPECIFIED KIDNEY, EXCEPT RENAL PELVIS: ICD-10-CM

## 2019-09-27 DIAGNOSIS — Z90.5 ACQUIRED ABSENCE OF KIDNEY: Chronic | ICD-10-CM

## 2019-09-27 DIAGNOSIS — Z95.0 PRESENCE OF CARDIAC PACEMAKER: Chronic | ICD-10-CM

## 2019-09-27 PROCEDURE — 99213 OFFICE O/P EST LOW 20 MIN: CPT

## 2019-09-27 PROCEDURE — 74150 CT ABDOMEN W/O CONTRAST: CPT

## 2019-09-27 PROCEDURE — 74150 CT ABDOMEN W/O CONTRAST: CPT | Mod: 26

## 2020-07-13 RX ORDER — POTASSIUM CITRATE 15 MEQ/1
15 MEQ TABLET, EXTENDED RELEASE ORAL
Qty: 180 | Refills: 3 | Status: ACTIVE | COMMUNITY
Start: 2018-12-07 | End: 1900-01-01

## 2020-09-25 ENCOUNTER — APPOINTMENT (OUTPATIENT)
Dept: UROLOGY | Facility: CLINIC | Age: 75
End: 2020-09-25
Payer: MEDICARE

## 2020-09-25 ENCOUNTER — APPOINTMENT (OUTPATIENT)
Dept: CT IMAGING | Facility: IMAGING CENTER | Age: 75
End: 2020-09-25
Payer: MEDICARE

## 2020-09-25 ENCOUNTER — APPOINTMENT (OUTPATIENT)
Dept: RADIOLOGY | Facility: IMAGING CENTER | Age: 75
End: 2020-09-25
Payer: MEDICARE

## 2020-09-25 ENCOUNTER — RESULT REVIEW (OUTPATIENT)
Age: 75
End: 2020-09-25

## 2020-09-25 ENCOUNTER — OUTPATIENT (OUTPATIENT)
Dept: OUTPATIENT SERVICES | Facility: HOSPITAL | Age: 75
LOS: 1 days | End: 2020-09-25
Payer: MEDICARE

## 2020-09-25 VITALS
RESPIRATION RATE: 17 BRPM | SYSTOLIC BLOOD PRESSURE: 163 MMHG | BODY MASS INDEX: 32.49 KG/M2 | TEMPERATURE: 97.8 F | DIASTOLIC BLOOD PRESSURE: 77 MMHG | HEART RATE: 54 BPM | WEIGHT: 220 LBS

## 2020-09-25 DIAGNOSIS — Z95.0 PRESENCE OF CARDIAC PACEMAKER: Chronic | ICD-10-CM

## 2020-09-25 DIAGNOSIS — C64.9 MALIGNANT NEOPLASM OF UNSPECIFIED KIDNEY, EXCEPT RENAL PELVIS: ICD-10-CM

## 2020-09-25 DIAGNOSIS — Z90.5 ACQUIRED ABSENCE OF KIDNEY: Chronic | ICD-10-CM

## 2020-09-25 PROCEDURE — 71046 X-RAY EXAM CHEST 2 VIEWS: CPT

## 2020-09-25 PROCEDURE — 74170 CT ABD WO CNTRST FLWD CNTRST: CPT | Mod: 26

## 2020-09-25 PROCEDURE — 99214 OFFICE O/P EST MOD 30 MIN: CPT

## 2020-09-25 PROCEDURE — 71046 X-RAY EXAM CHEST 2 VIEWS: CPT | Mod: 26

## 2020-09-25 PROCEDURE — 82565 ASSAY OF CREATININE: CPT

## 2020-09-25 PROCEDURE — 74170 CT ABD WO CNTRST FLWD CNTRST: CPT

## 2020-09-25 NOTE — HISTORY OF PRESENT ILLNESS
[None] : no symptoms [FreeTextEntry1] : Left partial nephrectomy in 2013  R ight perc stone removal in 2018 , Elevated creatinine Sees a nephrologist

## 2020-09-25 NOTE — PHYSICAL EXAM
[General Appearance - Well Developed] : well developed [General Appearance - Well Nourished] : well nourished [Abdomen Soft] : soft [Prostate Size ___ gm] : prostate size [unfilled] gm [Skin Color & Pigmentation] : normal skin color and pigmentation [Skin Turgor] : supple [Heart Rate And Rhythm] : Heart rate and rhythm were normal [] : no respiratory distress [Oriented To Time, Place, And Person] : oriented to person, place, and time [Normal Station and Gait] : the gait and station were normal for the patient's age [No Focal Deficits] : no focal deficits [No Palpable Adenopathy] : no palpable adenopathy

## 2022-09-07 NOTE — H&P PST ADULT - ANESTHESIA, PREVIOUS REACTION, PROFILE
Detail Level: Detailed Quality 226: Preventive Care And Screening: Tobacco Use: Screening And Cessation Intervention: Patient screened for tobacco use and is an ex/non-smoker Quality 130: Documentation Of Current Medications In The Medical Record: Current Medications Documented Quality 431: Preventive Care And Screening: Unhealthy Alcohol Use - Screening: Patient not identified as an unhealthy alcohol user when screened for unhealthy alcohol use using a systematic screening method none lower flipper

## 2022-09-23 ENCOUNTER — APPOINTMENT (OUTPATIENT)
Dept: UROLOGY | Facility: CLINIC | Age: 77
End: 2022-09-23

## 2022-09-23 ENCOUNTER — OUTPATIENT (OUTPATIENT)
Dept: OUTPATIENT SERVICES | Facility: HOSPITAL | Age: 77
LOS: 1 days | End: 2022-09-23
Payer: MEDICARE

## 2022-09-23 DIAGNOSIS — C64.9 MALIGNANT NEOPLASM OF UNSPECIFIED KIDNEY, EXCEPT RENAL PELVIS: ICD-10-CM

## 2022-09-23 DIAGNOSIS — Z87.442 PERSONAL HISTORY OF URINARY CALCULI: ICD-10-CM

## 2022-09-23 DIAGNOSIS — Z90.5 ACQUIRED ABSENCE OF KIDNEY: Chronic | ICD-10-CM

## 2022-09-23 DIAGNOSIS — Z95.0 PRESENCE OF CARDIAC PACEMAKER: Chronic | ICD-10-CM

## 2022-09-23 DIAGNOSIS — R35.0 FREQUENCY OF MICTURITION: ICD-10-CM

## 2022-09-23 PROCEDURE — 99214 OFFICE O/P EST MOD 30 MIN: CPT

## 2022-09-23 PROCEDURE — 76775 US EXAM ABDO BACK WALL LIM: CPT

## 2022-09-23 NOTE — PHYSICAL EXAM
[General Appearance - Well Developed] : well developed [General Appearance - Well Nourished] : well nourished [Abdomen Soft] : soft [Prostate Size ___ gm] : prostate size [unfilled] gm [Heart Rate And Rhythm] : Heart rate and rhythm were normal [] : no respiratory distress [Normal Station and Gait] : the gait and station were normal for the patient's age [No Focal Deficits] : no focal deficits

## 2022-09-23 NOTE — HISTORY OF PRESENT ILLNESS
[FreeTextEntry1] : Left radical nephrectomy 2013 \par pathology RCC Pt1b FG 3 \par Had a PERC stone removal by Dr Hoenig in 2018 .

## 2022-09-23 NOTE — ASSESSMENT
[FreeTextEntry1] : Renal ultrasound done today No evidence of recurrence  No stones . \par He just had a root canal for a bad tooth so lost some weight \par He thinks his creatinine is 1.1\par YAKELIN normal

## 2024-03-27 ENCOUNTER — OFFICE (OUTPATIENT)
Facility: LOCATION | Age: 79
Setting detail: OPHTHALMOLOGY
End: 2024-03-27
Payer: MEDICARE

## 2024-03-27 ENCOUNTER — RX ONLY (RX ONLY)
Age: 79
End: 2024-03-27

## 2024-03-27 DIAGNOSIS — H16.223: ICD-10-CM

## 2024-03-27 DIAGNOSIS — H31.29: ICD-10-CM

## 2024-03-27 DIAGNOSIS — H25.13: ICD-10-CM

## 2024-03-27 DIAGNOSIS — H35.033: ICD-10-CM

## 2024-03-27 DIAGNOSIS — H40.1131: ICD-10-CM

## 2024-03-27 PROCEDURE — 92004 COMPRE OPH EXAM NEW PT 1/>: CPT | Performed by: OPHTHALMOLOGY

## 2024-03-27 PROCEDURE — 92250 FUNDUS PHOTOGRAPHY W/I&R: CPT | Performed by: OPHTHALMOLOGY

## 2024-03-29 PROBLEM — H35.033 HYPERTENSIVE RETINOPATHY; BOTH EYES: Status: ACTIVE | Noted: 2024-03-27

## 2024-03-29 PROBLEM — H40.1131 GLAUCOMA-PRIMARY OPEN ANGLE; BOTH EYES MILD: Status: ACTIVE | Noted: 2024-03-27

## 2024-03-29 PROBLEM — H16.223 DRY EYE SYNDROME K SICCA; BOTH EYES: Status: ACTIVE | Noted: 2024-03-27

## 2024-03-29 PROBLEM — H25.13 CATARACT SENILE NUCLEAR SCLEROSIS; BOTH EYES: Status: ACTIVE | Noted: 2024-03-27

## 2024-03-29 PROBLEM — H31.29 PERIPAPILLARY ATROPHY ; BOTH EYES: Status: ACTIVE | Noted: 2024-03-27

## 2024-04-01 ASSESSMENT — KERATOMETRY
OS_K1POWER_DIOPTERS: 42.75
OD_K1POWER_DIOPTERS: 42.50
OS_K2POWER_DIOPTERS: 42.75
OD_AXISANGLE_DEGREES: 001
OD_K2POWER_DIOPTERS: 43.25
OS_AXISANGLE_DEGREES: 000

## 2024-04-01 ASSESSMENT — REFRACTION_CURRENTRX
OS_OVR_VA: 20/
OD_CYLINDER: SPHERE
OD_SPHERE: +2.50
OS_CYLINDER: SPHERE
OD_OVR_VA: 20/
OS_SPHERE: +2.50

## 2024-05-14 NOTE — DISCHARGE NOTE ADULT - ABILITY TO HEAR (WITH HEARING AID OR HEARING APPLIANCE IF NORMALLY USED):
Mildly to Moderately Impaired: difficulty hearing in some environments or speaker may need to increase volume or speak distinctly Xray Chest 1 View AP/PA

## 2024-07-31 ENCOUNTER — OFFICE (OUTPATIENT)
Facility: LOCATION | Age: 79
Setting detail: OPHTHALMOLOGY
End: 2024-07-31
Payer: MEDICARE

## 2024-07-31 DIAGNOSIS — H25.13: ICD-10-CM

## 2024-07-31 DIAGNOSIS — H35.033: ICD-10-CM

## 2024-07-31 DIAGNOSIS — H16.223: ICD-10-CM

## 2024-07-31 DIAGNOSIS — H31.29: ICD-10-CM

## 2024-07-31 DIAGNOSIS — H40.1131: ICD-10-CM

## 2024-07-31 PROCEDURE — 92083 EXTENDED VISUAL FIELD XM: CPT | Performed by: OPHTHALMOLOGY

## 2024-07-31 PROCEDURE — 99213 OFFICE O/P EST LOW 20 MIN: CPT | Performed by: OPHTHALMOLOGY

## 2024-07-31 PROCEDURE — 92133 CPTRZD OPH DX IMG PST SGM ON: CPT | Performed by: OPHTHALMOLOGY

## 2024-07-31 PROCEDURE — 92283 EXTND COLOR VISION XM: CPT | Performed by: OPHTHALMOLOGY

## 2024-07-31 ASSESSMENT — CONFRONTATIONAL VISUAL FIELD TEST (CVF)
OS_FINDINGS: FULL
OD_FINDINGS: FULL

## 2024-08-01 ASSESSMENT — KERATOMETRY
OS_K1POWER_DIOPTERS: 43.00
OD_K1POWER_DIOPTERS: 43.00
OD_K2POWER_DIOPTERS: 43.50
OS_K2POWER_DIOPTERS: 43.25
OS_AXISANGLE_DEGREES: 162
OD_AXISANGLE_DEGREES: 175

## 2024-08-01 ASSESSMENT — REFRACTION_CURRENTRX
OD_SPHERE: +2.50
OD_CYLINDER: SPHERE
OS_SPHERE: +2.50
OD_OVR_VA: 20/
OS_OVR_VA: 20/
OS_CYLINDER: SPHERE

## 2024-09-10 ENCOUNTER — APPOINTMENT (OUTPATIENT)
Dept: UROLOGY | Facility: CLINIC | Age: 79
End: 2024-09-10
Payer: MEDICARE

## 2024-09-10 VITALS
HEART RATE: 48 BPM | OXYGEN SATURATION: 97 % | DIASTOLIC BLOOD PRESSURE: 77 MMHG | SYSTOLIC BLOOD PRESSURE: 120 MMHG | RESPIRATION RATE: 16 BRPM

## 2024-09-10 DIAGNOSIS — C64.9 MALIGNANT NEOPLASM OF UNSPECIFIED KIDNEY, EXCEPT RENAL PELVIS: ICD-10-CM

## 2024-09-10 PROCEDURE — G2211 COMPLEX E/M VISIT ADD ON: CPT

## 2024-09-10 PROCEDURE — 99213 OFFICE O/P EST LOW 20 MIN: CPT

## 2024-09-10 NOTE — HISTORY OF PRESENT ILLNESS
[FreeTextEntry1] : pt sp radical left nephrectomy 2013 t1b fg3  also perc stone 2018 by dr hoenig. no difficulties. wt stable .  nocturia has a nephrologist.  creatinine normal. no psa and no family history.  Rectal. 32 brenign. hx rcc. 10 years out will get ct abd and chest.

## 2024-09-24 ENCOUNTER — APPOINTMENT (OUTPATIENT)
Dept: CT IMAGING | Facility: CLINIC | Age: 79
End: 2024-09-24
Payer: MEDICARE

## 2024-09-24 PROCEDURE — 71250 CT THORAX DX C-: CPT

## 2024-09-24 PROCEDURE — 74170 CT ABD WO CNTRST FLWD CNTRST: CPT

## 2024-09-24 PROCEDURE — 82565 ASSAY OF CREATININE: CPT | Mod: QW

## 2024-09-27 NOTE — H&P PST ADULT - ENERGY EXPENDITURE (METS)
Request Summary [[id number]]   Ordering Encounter Report      Procedure: SERVICE TO GASTROENTEROLOGY Status: Needs Scheduling   Proc # [id number]       Requested appt date:   Authorizing: Mikala Singh MD   Referral: [id number] (Authorized)         Priority: Routine   Diagnosis: Dysphagia, unspecified type [R13.10]      4 DASI=9.89 DASI=9

## 2024-12-06 ENCOUNTER — OFFICE (OUTPATIENT)
Facility: LOCATION | Age: 79
Setting detail: OPHTHALMOLOGY
End: 2024-12-06
Payer: MEDICARE

## 2024-12-06 DIAGNOSIS — H40.1131: ICD-10-CM

## 2024-12-06 DIAGNOSIS — H25.13: ICD-10-CM

## 2024-12-06 DIAGNOSIS — H35.033: ICD-10-CM

## 2024-12-06 PROCEDURE — 92250 FUNDUS PHOTOGRAPHY W/I&R: CPT | Performed by: OPHTHALMOLOGY

## 2024-12-06 PROCEDURE — 92014 COMPRE OPH EXAM EST PT 1/>: CPT | Performed by: OPHTHALMOLOGY

## 2024-12-06 ASSESSMENT — SUPERFICIAL PUNCTATE KERATITIS (SPK)
OD_SPK: T
OS_SPK: T

## 2024-12-06 ASSESSMENT — TONOMETRY
OD_IOP_MMHG: 18
OS_IOP_MMHG: 18

## 2024-12-06 ASSESSMENT — CONFRONTATIONAL VISUAL FIELD TEST (CVF)
OD_FINDINGS: FULL
OS_FINDINGS: FULL

## 2024-12-11 ASSESSMENT — REFRACTION_CURRENTRX
OD_OVR_VA: 20/
OD_CYLINDER: SPHERE
OD_SPHERE: +2.50
OS_SPHERE: +2.50
OS_OVR_VA: 20/
OS_CYLINDER: SPHERE

## 2024-12-11 ASSESSMENT — REFRACTION_AUTOREFRACTION
OD_CYLINDER: +1.50
OS_AXIS: 009
OS_SPHERE: +0.25
OD_SPHERE: 0.00
OS_CYLINDER: +1.50
OD_AXIS: 170

## 2024-12-11 ASSESSMENT — KERATOMETRY
OD_K2POWER_DIOPTERS: 43.50
OD_K1POWER_DIOPTERS: 43.00
OS_AXISANGLE_DEGREES: 162
OD_AXISANGLE_DEGREES: 175
OS_K2POWER_DIOPTERS: 43.25
OS_K1POWER_DIOPTERS: 43.00

## 2024-12-11 ASSESSMENT — VISUAL ACUITY
OS_BCVA: 20/30
OD_BCVA: 20/25-2

## 2025-04-11 ENCOUNTER — OFFICE (OUTPATIENT)
Facility: LOCATION | Age: 80
Setting detail: OPHTHALMOLOGY
End: 2025-04-11
Payer: MEDICARE

## 2025-04-11 DIAGNOSIS — H16.223: ICD-10-CM

## 2025-04-11 DIAGNOSIS — H40.1131: ICD-10-CM

## 2025-04-11 DIAGNOSIS — H35.033: ICD-10-CM

## 2025-04-11 PROCEDURE — 99213 OFFICE O/P EST LOW 20 MIN: CPT | Performed by: OPHTHALMOLOGY

## 2025-04-11 PROCEDURE — 92133 CPTRZD OPH DX IMG PST SGM ON: CPT | Performed by: OPHTHALMOLOGY

## 2025-04-11 ASSESSMENT — REFRACTION_AUTOREFRACTION
OD_AXIS: 164
OD_SPHERE: +0.25
OD_CYLINDER: +1.50
OS_SPHERE: +0.25
OS_AXIS: 004
OS_CYLINDER: +1.50

## 2025-04-11 ASSESSMENT — KERATOMETRY
OD_K2POWER_DIOPTERS: 43.50
OS_K2POWER_DIOPTERS: 43.25
OD_AXISANGLE_DEGREES: 175
OD_K1POWER_DIOPTERS: 43.00
OS_K1POWER_DIOPTERS: 43.00
OS_AXISANGLE_DEGREES: 162

## 2025-04-11 ASSESSMENT — VISUAL ACUITY
OD_BCVA: 20/25-2
OS_BCVA: 20/25-1

## 2025-04-11 ASSESSMENT — TONOMETRY
OS_IOP_MMHG: 18
OD_IOP_MMHG: 18

## 2025-04-11 ASSESSMENT — REFRACTION_CURRENTRX
OD_SPHERE: +2.50
OD_OVR_VA: 20/
OD_CYLINDER: SPHERE
OS_SPHERE: +2.50
OS_CYLINDER: SPHERE
OS_OVR_VA: 20/

## 2025-04-11 ASSESSMENT — SUPERFICIAL PUNCTATE KERATITIS (SPK)
OS_SPK: T
OD_SPK: T

## 2025-04-11 ASSESSMENT — CONFRONTATIONAL VISUAL FIELD TEST (CVF)
OS_FINDINGS: FULL
OD_FINDINGS: FULL

## 2025-08-25 ENCOUNTER — OFFICE (OUTPATIENT)
Facility: LOCATION | Age: 80
Setting detail: OPHTHALMOLOGY
End: 2025-08-25
Payer: MEDICARE

## 2025-08-25 DIAGNOSIS — H40.1131: ICD-10-CM

## 2025-08-25 DIAGNOSIS — H16.223: ICD-10-CM

## 2025-08-25 DIAGNOSIS — H35.033: ICD-10-CM

## 2025-08-25 DIAGNOSIS — H31.29: ICD-10-CM

## 2025-08-25 DIAGNOSIS — H25.13: ICD-10-CM

## 2025-08-25 PROCEDURE — 92250 FUNDUS PHOTOGRAPHY W/I&R: CPT | Performed by: OPHTHALMOLOGY

## 2025-08-25 PROCEDURE — 92014 COMPRE OPH EXAM EST PT 1/>: CPT | Performed by: OPHTHALMOLOGY

## 2025-08-25 PROCEDURE — 92083 EXTENDED VISUAL FIELD XM: CPT | Performed by: OPHTHALMOLOGY

## 2025-08-25 PROCEDURE — 92283 EXTND COLOR VISION XM: CPT | Performed by: OPHTHALMOLOGY

## 2025-08-25 ASSESSMENT — TONOMETRY
OS_IOP_MMHG: 17
OD_IOP_MMHG: 17

## 2025-08-25 ASSESSMENT — CONFRONTATIONAL VISUAL FIELD TEST (CVF)
OD_FINDINGS: FULL
OS_FINDINGS: FULL

## 2025-08-25 ASSESSMENT — SUPERFICIAL PUNCTATE KERATITIS (SPK)
OS_SPK: T
OD_SPK: T

## 2025-08-27 ASSESSMENT — KERATOMETRY
OS_AXISANGLE_DEGREES: 162
OD_K2POWER_DIOPTERS: 43.50
OS_K1POWER_DIOPTERS: 43.00
OD_K1POWER_DIOPTERS: 43.00
OD_AXISANGLE_DEGREES: 175
OS_K2POWER_DIOPTERS: 43.25

## 2025-08-27 ASSESSMENT — REFRACTION_CURRENTRX
OS_OVR_VA: 20/
OS_SPHERE: +2.50
OS_CYLINDER: SPHERE
OD_CYLINDER: SPHERE
OD_OVR_VA: 20/
OD_SPHERE: +2.50

## 2025-08-27 ASSESSMENT — REFRACTION_AUTOREFRACTION
OD_SPHERE: -0.25
OS_AXIS: 180
OS_SPHERE: PLANO
OD_CYLINDER: +1.25
OS_CYLINDER: +1.75
OD_AXIS: 153

## 2025-08-27 ASSESSMENT — VISUAL ACUITY
OS_BCVA: 20/25-1
OD_BCVA: 20/20